# Patient Record
Sex: FEMALE | Race: OTHER | HISPANIC OR LATINO | Employment: FULL TIME | URBAN - METROPOLITAN AREA
[De-identification: names, ages, dates, MRNs, and addresses within clinical notes are randomized per-mention and may not be internally consistent; named-entity substitution may affect disease eponyms.]

---

## 2018-05-02 LAB
BILIRUB UR QL STRIP: NEGATIVE MG/DL
CLARITY UR: CLEAR
COLOR UR: YELLOW
COMMENT (HISTORICAL): ABNORMAL
GLUCOSE UR STRIP-MCNC: NEGATIVE MG/DL
HGB UR QL STRIP.AUTO: 250
KETONES UR STRIP-MCNC: NEGATIVE MG/DL
LEUKOCYTE ESTERASE UR QL STRIP: 25
NITRITE UR QL STRIP: NEGATIVE
PH UR STRIP.AUTO: 5 [PH] (ref 4.5–8)
PREGNANCY TEST URINE (HISTORICAL): NEGATIVE
PROT UR STRIP-MCNC: 15 MG/DL
SP GR UR STRIP.AUTO: 1.02 (ref 1–1.04)
UROBILINOGEN UR QL STRIP.AUTO: NEGATIVE MG/DL (ref 0–1)

## 2018-05-06 LAB — GLUCOSE SERPL-MCNC: 82 MG/DL (ref 70–99)

## 2020-03-10 ENCOUNTER — HOSPITAL ENCOUNTER (EMERGENCY)
Facility: HOSPITAL | Age: 45
Discharge: HOME | End: 2020-03-10
Attending: EMERGENCY MEDICINE | Admitting: EMERGENCY MEDICINE
Payer: COMMERCIAL

## 2020-03-10 ENCOUNTER — APPOINTMENT (EMERGENCY)
Dept: RADIOLOGY | Facility: HOSPITAL | Age: 45
End: 2020-03-10
Payer: COMMERCIAL

## 2020-03-10 VITALS — OXYGEN SATURATION: 100 % | TEMPERATURE: 97.5 F | HEART RATE: 78 BPM | WEIGHT: 164.24 LBS | RESPIRATION RATE: 18 BRPM

## 2020-03-10 DIAGNOSIS — M54.9 BACK PAIN: Primary | ICD-10-CM

## 2020-03-10 LAB
EXT PREG TEST URINE: NEGATIVE
EXT. CONTROL ED NAV: NORMAL

## 2020-03-10 PROCEDURE — 99283 EMERGENCY DEPT VISIT LOW MDM: CPT

## 2020-03-10 PROCEDURE — 99284 EMERGENCY DEPT VISIT MOD MDM: CPT | Performed by: PHYSICIAN ASSISTANT

## 2020-03-10 PROCEDURE — 72070 X-RAY EXAM THORAC SPINE 2VWS: CPT

## 2020-03-10 PROCEDURE — 96372 THER/PROPH/DIAG INJ SC/IM: CPT

## 2020-03-10 PROCEDURE — 81025 URINE PREGNANCY TEST: CPT | Performed by: PHYSICIAN ASSISTANT

## 2020-03-10 RX ORDER — LIDOCAINE 50 MG/G
1 PATCH TOPICAL ONCE
Status: DISCONTINUED | OUTPATIENT
Start: 2020-03-10 | End: 2020-03-10 | Stop reason: HOSPADM

## 2020-03-10 RX ORDER — NAPROXEN 500 MG/1
500 TABLET ORAL 2 TIMES DAILY PRN
Qty: 20 TABLET | Refills: 0 | Status: SHIPPED | OUTPATIENT
Start: 2020-03-10

## 2020-03-10 RX ORDER — METHOCARBAMOL 500 MG/1
500 TABLET, FILM COATED ORAL 2 TIMES DAILY PRN
Qty: 20 TABLET | Refills: 0 | Status: SHIPPED | OUTPATIENT
Start: 2020-03-10

## 2020-03-10 RX ORDER — KETOROLAC TROMETHAMINE 30 MG/ML
15 INJECTION, SOLUTION INTRAMUSCULAR; INTRAVENOUS ONCE
Status: COMPLETED | OUTPATIENT
Start: 2020-03-10 | End: 2020-03-10

## 2020-03-10 RX ADMIN — KETOROLAC TROMETHAMINE 15 MG: 30 INJECTION, SOLUTION INTRAMUSCULAR at 15:21

## 2020-03-10 RX ADMIN — LIDOCAINE 1 PATCH: 50 PATCH TOPICAL at 14:25

## 2020-03-10 NOTE — ED PROVIDER NOTES
History  Chief Complaint   Patient presents with    Back Pain     pt reports lower back pain; patient denies injury, numbess in legs;      D C  is a 38year old female presenting to the ED with 10/10 right sided thoracic back pain x 2 days  Patient states she woke up yesterday with the pain which has progressively gotten worse since then  She had to leave work today because of it  She denies trauma/inciting incident  Patient denies fever, chest pain, SOB, dysuria, paraesthesias and bowel/bladder incontinence  She took 3 Motrin this morning with no relief of her symptoms  Patient has history of back/sciatic issues but has never followed up or seen specialist  Patient has IUD so last period is unknown  Patient states she is on no daily medication and has NKDA  None       History reviewed  No pertinent past medical history  Past Surgical History:   Procedure Laterality Date    APPENDECTOMY      HERNIA REPAIR         History reviewed  No pertinent family history  I have reviewed and agree with the history as documented  E-Cigarette/Vaping     E-Cigarette/Vaping Substances     Social History     Tobacco Use    Smoking status: Light Tobacco Smoker     Packs/day: 0 25     Years: 24 00     Pack years: 6 00    Smokeless tobacco: Never Used   Substance Use Topics    Alcohol use: Yes     Comment: socially    Drug use: No       Review of Systems   Constitutional: Negative for chills and fever  HENT: Negative for ear pain and sore throat  Eyes: Negative for photophobia and pain  Respiratory: Negative for cough, chest tightness and shortness of breath  Cardiovascular: Negative for chest pain  Gastrointestinal: Negative for abdominal pain, diarrhea, nausea and vomiting  Genitourinary: Negative for difficulty urinating and dysuria  Musculoskeletal: Positive for back pain  Negative for neck pain  Right sided thoracic pain  Skin: Negative for rash and wound     Neurological: Negative for headaches  Physical Exam  Physical Exam   Constitutional: She is oriented to person, place, and time  She appears well-developed and well-nourished  HENT:   Head: Normocephalic and atraumatic  Nose: Nose normal    Eyes: Pupils are equal, round, and reactive to light  Conjunctivae and EOM are normal    Neck: Normal range of motion  Neck supple  Cardiovascular: Normal rate, regular rhythm, normal heart sounds and intact distal pulses  Pulmonary/Chest: Effort normal and breath sounds normal    Abdominal: Soft  Bowel sounds are normal  She exhibits no distension  There is no tenderness  Musculoskeletal: She exhibits tenderness  Painful ROM  Tenderness and trigger points in right serratus posterior and paraspinal muscles  Thoracic spinous process tenderness  No cervical or lumbar spinous process tenderness  Neurological: She is alert and oriented to person, place, and time  Skin: Skin is warm and dry  Capillary refill takes less than 2 seconds  Psychiatric: She has a normal mood and affect  Her behavior is normal  Judgment and thought content normal    Nursing note and vitals reviewed        Vital Signs  ED Triage Vitals   Temperature Pulse Respirations BP SpO2   03/10/20 1348 03/10/20 1348 03/10/20 1348 -- 03/10/20 1348   97 5 °F (36 4 °C) 78 18  100 %      Temp Source Heart Rate Source Patient Position - Orthostatic VS BP Location FiO2 (%)   03/10/20 1348 03/10/20 1348 03/10/20 1348 03/10/20 1348 --   Temporal Monitor Sitting Right arm       Pain Score       03/10/20 1521       Worst Possible Pain           Vitals:    03/10/20 1348   Pulse: 78   Patient Position - Orthostatic VS: Sitting         Visual Acuity      ED Medications  Medications   ketorolac (TORADOL) injection 15 mg (15 mg Intramuscular Given 3/10/20 1521)       Diagnostic Studies  Results Reviewed     Procedure Component Value Units Date/Time    POCT pregnancy, urine [[id number]]  (Normal) Resulted:  03/10/20 1513    Lab Status:  Final result Updated:  03/10/20 1513     EXT PREG TEST UR (Ref: Negative) negative     Control valid                 XR thoracic spine 2 views   Final Result by Caleb Owen MD (03/10 4199)      No acute osseous abnormality  Workstation performed: [id number]                    Procedures  Procedures         ED Course                               MDM      Disposition  Final diagnoses:   Back pain     Time reflects when diagnosis was documented in both MDM as applicable and the Disposition within this note     Time User Action Codes Description Comment    3/10/2020  2:50 PM Gretel Longoria [M54 9] Back pain       ED Disposition     ED Disposition Condition Date/Time Comment    Discharge Stable Tue Mar 10, 2020  2:56 PM Faisaladolfo Ron discharge to home/self care  Follow-up Information     Follow up With Specialties Details Why Contact Info Additional Information    [facility] Physical Therapy Schedule an appointment as soon as possible for a visit  As needed           Discharge Medication List as of 3/10/2020  3:28 PM      START taking these medications    Details   methocarbamol (ROBAXIN) 500 mg tablet Take 1 tablet (500 mg total) by mouth 2 (two) times a day as needed for muscle spasms, Starting Tue 3/10/2020, Normal      naproxen (NAPROSYN) 500 mg tablet Take 1 tablet (500 mg total) by mouth 2 (two) times a day as needed for moderate pain, Starting Tue 3/10/2020, Normal           No discharge procedures on file      PDMP Review     None          ED Provider  Electronically Signed by           Sidra Joyner PA-C  03/10/20 0329

## 2020-03-10 NOTE — DISCHARGE INSTRUCTIONS
Follow up with [facility]  Return to ED if you develop worsening symptoms such as bowel/bladder numbness or incontinence

## 2020-03-12 ENCOUNTER — NURSE TRIAGE (OUTPATIENT)
Dept: PHYSICAL THERAPY | Facility: OTHER | Age: 45
End: 2020-03-12

## 2020-03-12 ENCOUNTER — TELEPHONE (OUTPATIENT)
Dept: PHYSICAL THERAPY | Facility: OTHER | Age: 45
End: 2020-03-12

## 2020-03-12 DIAGNOSIS — M54.6 ACUTE RIGHT-SIDED THORACIC BACK PAIN: Primary | ICD-10-CM

## 2020-03-12 NOTE — TELEPHONE ENCOUNTER
Additional Information   Negative: Is this related to an MVA?  Negative: Is this related to a work injury?  Negative: Are you currently recieving homecare services?  Negative: Is this a chronic condition?  Negative: Is the patient experiencing blood in sputum?  Negative: Has the patient experienced major trauma? (fall from height, high speed collision, direct blow to spine) and is also experiencing nausea, light-headedness, or loss of consciousness?  Negative: Is the patient experiencing acute drop foot or paralysis?  Negative: Is the patient experiencing urine retention?  Negative: Does the patient have a fever?  Negative: Has the patient had unexplained weight loss? Background - Initial Assessment  Clinical complaint: Acute right thoracic back pain, no known cause, and has had this for the past 6 days  Pt stated that she has had this in the past  Pt was given Naproxen and Robaxin, and is using a heating pad, Lidocaine patches  No neck or back surgeries, and no specialist     Date of onset: 6 days  Frequency of pain: intermittent  Quality of pain: sharp, shooting and stabbing    Protocols used: [facility] COMPREHENSIVE SPINE PROGRAM PROTOCOL    This nurse did review in detail the comp spine program and what we can provide for the pt for their back pain  Pt is agreeable to being triaged by this nurse and would like to have physical therapy  Referrals were placed to the following:  Physical Therapy at the [facility]  Pt was given the ph # to that office  Behavioral Health referral was sent and Pt is aware that they will be receiving a phone call from that office  No further questions voiced at this time and Pt did state understanding of the referral that was placed

## 2020-03-12 NOTE — TELEPHONE ENCOUNTER
Message left for Pt to call us back  Our ph # and hours of business provided  Waiting for return call from Pt  This nurse was returning Pt's phone call from earlier today      No referral noted

## 2021-04-06 ENCOUNTER — IMMUNIZATIONS (OUTPATIENT)
Dept: FAMILY MEDICINE CLINIC | Facility: HOSPITAL | Age: 46
End: 2021-04-06

## 2021-04-06 DIAGNOSIS — Z23 ENCOUNTER FOR IMMUNIZATION: Primary | ICD-10-CM

## 2021-04-06 PROCEDURE — 91301 SARS-COV-2 / COVID-19 MRNA VACCINE (MODERNA) 100 MCG: CPT

## 2021-04-06 PROCEDURE — 0011A SARS-COV-2 / COVID-19 MRNA VACCINE (MODERNA) 100 MCG: CPT

## 2021-05-07 ENCOUNTER — IMMUNIZATIONS (OUTPATIENT)
Dept: FAMILY MEDICINE CLINIC | Facility: HOSPITAL | Age: 46
End: 2021-05-07

## 2021-05-07 DIAGNOSIS — Z23 ENCOUNTER FOR IMMUNIZATION: Primary | ICD-10-CM

## 2021-05-07 PROCEDURE — 91301 SARS-COV-2 / COVID-19 MRNA VACCINE (MODERNA) 100 MCG: CPT

## 2021-05-07 PROCEDURE — 0012A SARS-COV-2 / COVID-19 MRNA VACCINE (MODERNA) 100 MCG: CPT
